# Patient Record
Sex: MALE | Race: WHITE | NOT HISPANIC OR LATINO
[De-identification: names, ages, dates, MRNs, and addresses within clinical notes are randomized per-mention and may not be internally consistent; named-entity substitution may affect disease eponyms.]

---

## 2021-06-21 ENCOUNTER — APPOINTMENT (OUTPATIENT)
Dept: UROLOGY | Facility: CLINIC | Age: 66
End: 2021-06-21

## 2021-08-02 ENCOUNTER — APPOINTMENT (OUTPATIENT)
Dept: UROLOGY | Facility: CLINIC | Age: 66
End: 2021-08-02
Payer: MEDICARE

## 2021-08-02 VITALS — WEIGHT: 195 LBS | BODY MASS INDEX: 31.34 KG/M2 | HEIGHT: 66 IN

## 2021-08-02 DIAGNOSIS — Z80.1 FAMILY HISTORY OF MALIGNANT NEOPLASM OF TRACHEA, BRONCHUS AND LUNG: ICD-10-CM

## 2021-08-02 DIAGNOSIS — Z78.9 OTHER SPECIFIED HEALTH STATUS: ICD-10-CM

## 2021-08-02 DIAGNOSIS — Z87.19 PERSONAL HISTORY OF OTHER DISEASES OF THE DIGESTIVE SYSTEM: ICD-10-CM

## 2021-08-02 PROBLEM — Z00.00 ENCOUNTER FOR PREVENTIVE HEALTH EXAMINATION: Status: ACTIVE | Noted: 2021-08-02

## 2021-08-02 PROCEDURE — 99204 OFFICE O/P NEW MOD 45 MIN: CPT

## 2021-08-02 RX ORDER — AZILSARTAN KAMEDOXOMIL 80 MG/1
TABLET ORAL
Refills: 0 | Status: ACTIVE | COMMUNITY

## 2021-08-02 RX ORDER — FAMOTIDINE 10 MG/ML
10 VIAL (ML) INTRAVENOUS
Refills: 0 | Status: ACTIVE | COMMUNITY

## 2021-08-02 NOTE — ASSESSMENT
[FreeTextEntry1] : HERLINDA RAMIREZ is a 65 year old male with a Hx of Seizure disorder after head trauma, who presents for consultation for LUTS predominantly nocturia, premature ejaculation, and questionable adrenal gland thickening. \par \par Suspect FREIDA for nocturia\par Plan: \par -Biochemical w/u for the adrenal gland though suspicion low \par -Recommending conservative management of nocturia at this time with eliminating fluids two hours before sleep, voiding prior to bedtime, leg elevation.\par -Pt is following up with gastroenterology for the gallbladder. \par Voiding diary, \par UA \par Pelvic US.\par f/u in 6-8 weeks

## 2021-08-02 NOTE — HISTORY OF PRESENT ILLNESS
[FreeTextEntry1] : HERLINDA RAMIREZ is a 65 year old male with a Hx of Seizure disorder after head trauma, who presents for consultation for LUTS predominantly nocturia, premature ejaculation, and questionable adrenal gland thickening. \par \par Pt reports Urinary Sx: nocturia 2-3x occurring in the last 8 years, worsening in the last 3 years. \par  Pt reports good force of stream, frequency q 2 h, reports rare urgency. \par Pt reports seizure disorder after trauma in the past, with hematoma. \par Denies gross hematuria, dysuria or associated symptoms. \par Pt reports daytime sleepiness, doesn't recall if he snores at night. \par Pt reports premature ejaculation.\par \par Pt reports he is f/u with Gastroenterolgy for the Gallbladder. \par \par Denies  PMH including previous kidney stones, recurrent UTIs. \par Family History: No  malignancies.\par \par US abdominal from 5/2021: Prominent slightly fatty liver, Cholelithiasis with likely cholecystitis, likely Rt adrenal adenoma. \par CT abdomen/ pelvis with contrast from 7/15/2021: no gross evidence of adrenal adenomas, Mild Lt adrenal thickening. Mild prominence of the gallbladder fundus. enhancement of the gallbladder fundus not excluded as a result of lacking of unenchanced exam. Consider unenchanced CT f/u \par \par 2/2021: \par PSA= 0.82 % free 35 \par PTH= 122\par

## 2021-08-02 NOTE — ADDENDUM
[FreeTextEntry1] : Patient's note was transcribed with the assistance of a medical scribe under the supervision of Dr. Storey.\par I, Dr. Storey, have reviewed the patient's chart and agree that it aligns with my medical decisions.\par Rosey Juarez, our scribe, also served as a chaperone for physical examination purposes.\par \par \par

## 2021-08-02 NOTE — PHYSICAL EXAM
[General Appearance - Well Developed] : well developed [General Appearance - Well Nourished] : well nourished [Normal Appearance] : normal appearance [Well Groomed] : well groomed [General Appearance - In No Acute Distress] : no acute distress [Respiration, Rhythm And Depth] : normal respiratory rhythm and effort [Abdomen Soft] : soft [Exaggerated Use Of Accessory Muscles For Inspiration] : no accessory muscle use [Abdomen Tenderness] : non-tender [Costovertebral Angle Tenderness] : no ~M costovertebral angle tenderness [Urethral Meatus] : meatus normal [Urinary Bladder Findings] : the bladder was normal on palpation [Scrotum] : the scrotum was normal [Testes Mass (___cm)] : there were no testicular masses [Normal Station and Gait] : the gait and station were normal for the patient's age [] : no rash [No Focal Deficits] : no focal deficits [Oriented To Time, Place, And Person] : oriented to person, place, and time [Affect] : the affect was normal [Mood] : the mood was normal [Not Anxious] : not anxious [No Palpable Adenopathy] : no palpable adenopathy [FreeTextEntry1] : n

## 2021-08-05 ENCOUNTER — NON-APPOINTMENT (OUTPATIENT)
Age: 66
End: 2021-08-05

## 2021-08-05 LAB
APPEARANCE: CLEAR
BILIRUBIN URINE: NEGATIVE
BLOOD URINE: NEGATIVE
COLOR: YELLOW
GLUCOSE QUALITATIVE U: NEGATIVE
KETONES URINE: NEGATIVE
LEUKOCYTE ESTERASE URINE: NEGATIVE
NITRITE URINE: NEGATIVE
PH URINE: 6
PROTEIN URINE: NORMAL
SPECIFIC GRAVITY URINE: 1.02
UROBILINOGEN URINE: NORMAL

## 2021-08-09 ENCOUNTER — NON-APPOINTMENT (OUTPATIENT)
Age: 66
End: 2021-08-09

## 2021-08-16 ENCOUNTER — TRANSCRIPTION ENCOUNTER (OUTPATIENT)
Age: 66
End: 2021-08-16

## 2021-08-19 ENCOUNTER — NON-APPOINTMENT (OUTPATIENT)
Age: 66
End: 2021-08-19

## 2021-08-25 ENCOUNTER — RESULT REVIEW (OUTPATIENT)
Age: 66
End: 2021-08-25

## 2021-08-25 ENCOUNTER — OUTPATIENT (OUTPATIENT)
Dept: OUTPATIENT SERVICES | Facility: HOSPITAL | Age: 66
LOS: 1 days | Discharge: HOME | End: 2021-08-25
Payer: MEDICARE

## 2021-08-25 DIAGNOSIS — F52.4 PREMATURE EJACULATION: ICD-10-CM

## 2021-08-25 PROCEDURE — 76857 US EXAM PELVIC LIMITED: CPT | Mod: 26

## 2021-08-26 ENCOUNTER — NON-APPOINTMENT (OUTPATIENT)
Age: 66
End: 2021-08-26

## 2021-09-20 ENCOUNTER — APPOINTMENT (OUTPATIENT)
Dept: UROLOGY | Facility: CLINIC | Age: 66
End: 2021-09-20
Payer: MEDICARE

## 2021-09-20 VITALS — BODY MASS INDEX: 30.61 KG/M2 | WEIGHT: 195 LBS | HEIGHT: 67 IN

## 2021-09-20 DIAGNOSIS — R35.1 NOCTURIA: ICD-10-CM

## 2021-09-20 PROCEDURE — 99214 OFFICE O/P EST MOD 30 MIN: CPT

## 2021-09-20 NOTE — ASSESSMENT
[FreeTextEntry1] : HERLINDA RAMIREZ is a 65 year old male with a Hx of Seizure disorder after head trauma, who presents for consultation for LUTS predominantly nocturia, premature ejaculation, and questionable adrenal gland thickening. \par \par Nocturnal polyuria  Suspect FREIDA for nocturia\par \par Plan: \par -Sleep study to asses FREIDA\par -Biochemical w/u for the adrenal gland though suspicion low \par -Recommending Continue  conservative management of nocturia at this time with eliminating fluids two hours before sleep, voiding prior to bedtime, leg elevation.\par - for Premature Ejaculation, start Lidocaine Prilocaine cream \par fu 3 mo

## 2021-09-20 NOTE — HISTORY OF PRESENT ILLNESS
[FreeTextEntry1] : HERLINDA RAMIREZ is a 65 year old male with a Hx of Seizure disorder after head trauma, who presents for consultation for LUTS predominantly nocturia, premature ejaculation, and questionable adrenal gland thickening. \par \par Reports no gross hematuria, no dysuria at this time. Reports good force of stream. However nocturia 2-3 x w significant quantity.\par Pt reports he has not tested for FREIDA. \par Pt reports he saw GI and reports no issues at this time (as per Pt). \par Pt reports 1-3 minute ejaculatory latency \par \par Bladder US images visualized from 8/25/2021: Bladder not thickened, PVR=25 cc, Prostate V=30 grams. no median lobe.\par Voiding Diary  9/2021: voided: 1020 ml day // voided: 1260 ml during the nigh // total 2280. Minimal pm fluid intake. \par \par 8/2021: \par UA= Protein trace // no microhematuria, no pyuria, nitrates negative.  \par UCX= 50,000 - 99,000 CFU/mL Enterococcus faecalis\par \par Previously: \par Denies  PMH including previous kidney stones, recurrent UTIs. \par Family History: No  malignancies.\par \par US abdominal from 5/2021: Prominent slightly fatty liver, Cholelithiasis with likely cholecystitis, likely Rt adrenal adenoma. \par CT abdomen/ pelvis with contrast from 7/15/2021: no gross evidence of adrenal adenomas, Mild Lt adrenal thickening. Mild prominence of the gallbladder fundus. enhancement of the gallbladder fundus not excluded as a result of lacking of unenchanced exam. Consider unenchanced CT f/u \par \par 2/2021: \par PSA= 0.82 % free 35 \par PTH= 122\par

## 2021-12-20 ENCOUNTER — APPOINTMENT (OUTPATIENT)
Dept: UROLOGY | Facility: CLINIC | Age: 66
End: 2021-12-20
Payer: MEDICARE

## 2021-12-20 VITALS — WEIGHT: 195 LBS | BODY MASS INDEX: 30.61 KG/M2 | HEIGHT: 67 IN

## 2021-12-20 DIAGNOSIS — E27.9 DISORDER OF ADRENAL GLAND, UNSPECIFIED: ICD-10-CM

## 2021-12-20 DIAGNOSIS — F52.4 PREMATURE EJACULATION: ICD-10-CM

## 2021-12-20 DIAGNOSIS — R35.81 NOCTURNAL POLYURIA: ICD-10-CM

## 2021-12-20 PROCEDURE — 99214 OFFICE O/P EST MOD 30 MIN: CPT

## 2021-12-20 RX ORDER — LIDOCAINE AND PRILOCAINE 25; 25 MG/G; MG/G
2.5-2.5 CREAM TOPICAL
Qty: 1 | Refills: 5 | Status: ACTIVE | COMMUNITY
Start: 2021-09-20 | End: 1900-01-01

## 2021-12-20 NOTE — HISTORY OF PRESENT ILLNESS
[FreeTextEntry1] : HERLINDA RAMIREZ is a 66 year old male with a Hx of Seizure disorder after head trauma, who presents for consultation for LUTS predominantly nocturia, premature ejaculation, and questionable adrenal gland thickening. \par \par Pt reports he did not receive Lidocaine-Prilocaine cream  Rxed on our last visit. \par Pt reports he is still not experiencing daytime symptoms. He states he voids well during the day, voiding every 4 hours. Reports no dysuria, no gross hematuria at this time. \par \par Sleep study reviewed: O2 desaturation 210 // \par \par Adrenal mass w/u from 10/2021: \par aldosterone renin ratio 6/2.71 = 2.21 \par Metanephrine<25 (normal)\par \par Previously: \par Pt reports 1-3 minute ejaculatory latency \par \par Bladder US images from 8/25/2021: Bladder not thickened, PVR=25 cc, Prostate V=30 grams. no median lobe.\par Voiding Diary  9/2021: voided: 1020 ml day // voided: 1260 ml during the nigh // total 2280. Minimal pm fluid intake. \par \par 8/2021: \par UA= Protein trace // no microhematuria, no pyuria, nitrates negative.  \par UCX= 50,000 - 99,000 CFU/mL Enterococcus faecalis\par \par Previously: \par Denies  PMH including previous kidney stones, recurrent UTIs. \par Family History: No  malignancies.\par \par US abdominal from 5/2021: Prominent slightly fatty liver, Cholelithiasis with likely cholecystitis, likely Rt adrenal adenoma. \par CT abdomen/ pelvis with contrast from 7/15/2021: no gross evidence of adrenal adenomas, Mild Lt adrenal thickening. Mild prominence of the gallbladder fundus. enhancement of the gallbladder fundus not excluded as a result of lacking of unenchanced exam. Consider unenchanced CT f/u \par \par 2/2021: \par PSA= 0.82 % free 35 \par PTH= 122\par

## 2021-12-20 NOTE — ASSESSMENT
[FreeTextEntry1] : HERLINDA RAMIREZ is a 66 year old male with a Hx of Seizure disorder after head trauma, who presents for consultation for LUTS predominantly nocturia, premature ejaculation, and questionable adrenal gland thickening. \par \par Nocturnal polyuria related to FREIDA as per recent sleep study. \par \par Plan: \par -Sleep study will be discussed with a Sleep Doctor tomorrow. Offered to the pt to f/u with Dr. Bean Storey however pt declined, and would like to stay in [place]. \par -Recommending Continue  conservative management of nocturia at this time with eliminating fluids two hours before sleep, voiding prior to bedtime, leg elevation.\par - 24 hour cortisol still pending \par - for Premature Ejaculation, start Lidocaine Prilocaine cream (all the effects explained, pt verbalized understanding).\par fu 6 months reasess if luts improved after sleep mask

## 2022-06-20 ENCOUNTER — APPOINTMENT (OUTPATIENT)
Dept: UROLOGY | Facility: CLINIC | Age: 67
End: 2022-06-20

## 2023-01-24 ENCOUNTER — APPOINTMENT (OUTPATIENT)
Dept: GASTROENTEROLOGY | Facility: CLINIC | Age: 68
End: 2023-01-24

## 2023-10-02 ENCOUNTER — APPOINTMENT (OUTPATIENT)
Dept: AFTER HOURS CARE | Facility: EMERGENCY ROOM | Age: 68
End: 2023-10-02